# Patient Record
Sex: FEMALE | Race: WHITE | NOT HISPANIC OR LATINO | Employment: OTHER | URBAN - METROPOLITAN AREA
[De-identification: names, ages, dates, MRNs, and addresses within clinical notes are randomized per-mention and may not be internally consistent; named-entity substitution may affect disease eponyms.]

---

## 2022-08-03 ENCOUNTER — TELEPHONE (OUTPATIENT)
Dept: WOUND CARE | Facility: CLINIC | Age: 77
End: 2022-08-03

## 2022-08-03 ENCOUNTER — MEDICAL CORRESPONDENCE (OUTPATIENT)
Dept: HEALTH INFORMATION MANAGEMENT | Facility: CLINIC | Age: 77
End: 2022-08-03

## 2022-08-03 NOTE — TELEPHONE ENCOUNTER
Patient's daughter requesting appointment for patient to be seen for a right worthy wound. Can call Olivia (daughter) for triage or scheduling. Patient's home care RN, Karla, can also be reached with triage questions    Patient referred by [facility].   Ora ()  Karla (Home Care Nurse) [phone]  Olivia (patient's daughter) [phone]

## 2022-08-04 NOTE — TELEPHONE ENCOUNTER
Patient's daughter, Olivia, calling to verify referral from [facility] has been received.  States patient's previous wound care was provided by [facility].  Wanting to change to [facility] because home care is provided by [facility].     Olivia's Phone:   [phone]

## 2022-08-04 NOTE — TELEPHONE ENCOUNTER
Called Karla home care nurse regarding patient right shin wound.  She stated that patient needs a new wound treatment since the wound has gotten a lot better. Patient was on a wound vac and that was d/c'd and is now on a daily hydrofera blue treatment and cover.  Wound is granulating, decreasing in size, and no drainage.    Stated to Karla that we do need a referral to be seen here at the clinic, and was given [facility] phone number.  Waiting for the referral to proceed.

## 2022-08-08 NOTE — TELEPHONE ENCOUNTER
Consult received via phone and fax from daughter (and PCP provider aware) for wound of the right shin.    Patient has history of skin cancer, excision site right shin non healing.  Per daughter, has no known circulatory problems.  Has been followed by [facility].    Please schedule with CHITO Maxwell PA-C.     Is patient a [facility] lift?  Unknown; , please inquire.

## 2022-08-24 ENCOUNTER — HOSPITAL ENCOUNTER (OUTPATIENT)
Dept: WOUND CARE | Facility: CLINIC | Age: 77
Discharge: HOME | End: 2022-08-24
Attending: PHYSICIAN ASSISTANT | Admitting: PHYSICIAN ASSISTANT
Payer: COMMERCIAL

## 2022-08-24 VITALS — DIASTOLIC BLOOD PRESSURE: 115 MMHG | SYSTOLIC BLOOD PRESSURE: 158 MMHG | TEMPERATURE: 97.9 F | HEART RATE: 86 BPM

## 2022-08-24 DIAGNOSIS — L97.912 ULCER OF RIGHT LOWER EXTREMITY WITH FAT LAYER EXPOSED (H): ICD-10-CM

## 2022-08-24 PROBLEM — I10 ESSENTIAL HYPERTENSION: Status: ACTIVE | Noted: 2021-11-08

## 2022-08-24 PROBLEM — M85.80 OSTEOPENIA: Status: ACTIVE | Noted: 2021-11-12

## 2022-08-24 PROBLEM — M35.3 POLYMYALGIA RHEUMATICA (H): Status: ACTIVE | Noted: 2021-11-08

## 2022-08-24 PROBLEM — G47.33 OSA (OBSTRUCTIVE SLEEP APNEA): Status: ACTIVE | Noted: 2021-01-15

## 2022-08-24 PROBLEM — M54.2 NECK PAIN: Status: ACTIVE | Noted: 2022-07-10

## 2022-08-24 PROBLEM — I61.9 INTRAPARENCHYMAL HEMORRHAGE OF BRAIN (H): Status: ACTIVE | Noted: 2022-07-10

## 2022-08-24 PROBLEM — S12.100A CLOSED ODONTOID FRACTURE (H): Status: ACTIVE | Noted: 2022-07-10

## 2022-08-24 PROBLEM — F51.04 CHRONIC INSOMNIA: Status: ACTIVE | Noted: 2021-05-20

## 2022-08-24 PROBLEM — T14.8XXD WOUND HEALING, DELAYED: Status: ACTIVE | Noted: 2022-06-16

## 2022-08-24 PROBLEM — C44.722 SQUAMOUS CELL CARCINOMA, LEG, RIGHT: Status: ACTIVE | Noted: 2022-06-23

## 2022-08-24 PROBLEM — R35.1 NOCTURIA: Status: ACTIVE | Noted: 2021-05-20

## 2022-08-24 PROCEDURE — G0463 HOSPITAL OUTPT CLINIC VISIT: HCPCS | Mod: 25

## 2022-08-24 PROCEDURE — 17250 CHEM CAUT OF GRANLTJ TISSUE: CPT | Performed by: PHYSICIAN ASSISTANT

## 2022-08-24 PROCEDURE — 99204 OFFICE O/P NEW MOD 45 MIN: CPT | Mod: 25 | Performed by: PHYSICIAN ASSISTANT

## 2022-08-24 NOTE — PROGRESS NOTES
Patient arrived for wound care visit. Certified Wound Care Nurse time spent evaluating patient record, completed a full evaluation and documented wound(s) & cole-wound skin; provided recommendation based on treatment plan. Applied dressing, reviewed discharge instructions, patient education, and discussed plan of care with appropriate medical team staff members and patient and/or family members.

## 2022-08-24 NOTE — PROGRESS NOTES
[facility] [facility]    ASSESSMENT:   1. Full thickness surgical ulcer of RLE  2. 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 Wound care plan: collagen, superabsorbent, spandagrip  2. Call if hypergranulation tissue persists and can consider nurse/provider visit for silver nitrate  3. Can stop immobilization boot  4. See bottom of note for detailed wound care and patient instructions    HISTORY OF PRESENT ILLNESS:   Kitty Piper is a 77 year old female with PMHX of afib on xarelto, KIN, PMR on prednisone (tapering off on 1 mg) who presents for a slow healing RLE wound. Originally started traumatically in October of 2021 after hitting her leg on a bush. 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 This was down to muscle and initially required a wound VAC and she continues with an immobilizing boot. Fortunately today the wound is very smaller and just a bit hypergranular. It has been dressed with Hydrofera Blue three times a week by home care.     VITALS: BP (!) 158/115 (BP Location: Right arm)   Pulse 86   Temp 97.9  F (36.6  C)      PHYSICAL EXAM:  GENERAL: Patient is alert and oriented and in no acute distress  CV: palpable pedal pulses  INTEGUMENTARY:      08/24/22 1000   Wound (used by OP [facility] only) 08/24/22 1030 Right anterior;lower leg laceration   Placement Date/Time: 08/24/22 1030   Side: Right  Orientation: anterior;lower  Location: leg  Type: laceration   Thickness/Stage full thickness   Base hypergranulation   Periwound intact   Periwound Temperature warm   Periwound Skin Turgor soft   Edges open   Length (cm) 1.1   Width (cm) 1.2   Depth (cm) 0.2   Wound (cm^2) 1.32 cm^2   Wound Volume (cm^3) 0.26 cm^3   Drainage Characteristics/Odor serosanguineous   Drainage Amount moderate   Care, Wound chemical cautery applied         PROCEDURE (RLE): After verbal consent was obtained, hypergranulation tissue was treated  with silver nitrate. Patient tolerated this well.     MDM: 45 minutes were spent on the date of the visit reviewing previous chart notes, evaluating patient and developing the treatment plan, this excludes any time spent on procedures.     PATIENT INSTRUCTIONS      Further instructions from your care team       Kitty Piper      1945    [facility] Phone: [phone] Fax: [phone]    Medications/supplements to aid in healin. Vitamin D3 5,000 iu per day  2. Vitamin C 1,000 mg take daily    Wound Dressing Change: Right anterior lower leg  Cleanse with mild unscented soap and water  Apply endoform antimicrobial to wound  Then apply a superabsorbent dressing such as Kerramax or xtrasorb  Then secure with roll gauze and tape  Then apply spandigrip (tubigrip) E  Change three times a week    Compression:   Your compression is Spandigrip (tubigrip) E and can be removed at night and put back on first thing in the morning.   Please remove compression dressing if toes turn blue and/or tingle and can not be relieved by raising the leg for one hour. If unable to reapply in the morning, keep compression on until next dressing change.    Walk as much as you can, as you are able. Whenever you sit raise your ankle above your hips to promote wound healing.     Ok to stop wearing the boot    Ok to cancel the follow up visit if the wound has healed. If the wound heals, continue the compression sleeve for another month after wound healing.     Leyla Maxwell PA-C 2022    Call us at [phone] if you have any questions about your wounds, have redness or swelling around your wound, have a fever of 101 or greater or if you have any other problems or concerns. We answer the phone Monday through Friday 8 am to 4 pm, please leave a message as we check the voicemail frequently throughout the day.     If you had a positive experience please indicate that on your patient satisfaction survey form that [facility] will be sending you.    It was a pleasure meeting with you today.  Thank you for allowing me and my team the privilege of caring for you today.  YOU are the reason we are here, and I truly hope we provided you with the excellent service you deserve.  Please let us know if there is anything else we can do for you so that we can be sure you are leaving completely satisfied with your care experience.      If you have any billing related questions please call the [facility] Business office at [phone]. The clinic staff does not handle billing related matters.    If you are scheduled to have a follow up appointment, you will receive a reminder call the day before your visit. On the appointment day please arrive 15 minutes prior to your appointment time. If you are unable to keep that appointment, please call the clinic to cancel or reschedule. If you are more than 10 minutes late or greater for your appointment, the clinic policy is that you may be asked to reschedule.              Electronically signed by Leyla Maxwell PA-C on August 24, 2022

## 2022-08-24 NOTE — DISCHARGE INSTRUCTIONS
Kitty TAYLER Piper      1945    [facility] Phone: [phone] Fax: [phone]    Medications/supplements to aid in healing:  Vitamin D3 5,000 iu per day  Vitamin C 1,000 mg take daily    Wound Dressing Change: Right anterior lower leg  Cleanse with mild unscented soap and water  Apply endoform antimicrobial to wound  Then apply a superabsorbent dressing such as Kerramax or xtrasorb  Then secure with roll gauze and tape  Then apply spandigrip (tubigrip) E  Change three times a week    Compression:   Your compression is Spandigrip (tubigrip) E and can be removed at night and put back on first thing in the morning.   Please remove compression dressing if toes turn blue and/or tingle and can not be relieved by raising the leg for one hour. If unable to reapply in the morning, keep compression on until next dressing change.    Walk as much as you can, as you are able. Whenever you sit raise your ankle above your hips to promote wound healing.     Ok to stop wearing the boot    Ok to cancel the follow up visit if the wound has healed. If the wound heals, continue the compression sleeve for another month after wound healing.     Leyla Maxwell PA-C August 24, 2022    Call us at [phone] if you have any questions about your wounds, have redness or swelling around your wound, have a fever of 101 or greater or if you have any other problems or concerns. We answer the phone Monday through Friday 8 am to 4 pm, please leave a message as we check the voicemail frequently throughout the day.     If you had a positive experience please indicate that on your patient satisfaction survey form that [facility] will be sending you.    It was a pleasure meeting with you today.  Thank you for allowing me and my team the privilege of caring for you today.  YOU are the reason we are here, and I truly hope we provided you with the excellent service you deserve.  Please let us know if there is anything else we can do  for you so that we can be sure you are leaving completely satisfied with your care experience.      If you have any billing related questions please call the [facility] Business office at [phone]. The clinic staff does not handle billing related matters.    If you are scheduled to have a follow up appointment, you will receive a reminder call the day before your visit. On the appointment day please arrive 15 minutes prior to your appointment time. If you are unable to keep that appointment, please call the clinic to cancel or reschedule. If you are more than 10 minutes late or greater for your appointment, the clinic policy is that you may be asked to reschedule.

## 2022-09-01 ENCOUNTER — TELEPHONE (OUTPATIENT)
Dept: WOUND CARE | Facility: CLINIC | Age: 77
End: 2022-09-01

## 2022-09-01 NOTE — TELEPHONE ENCOUNTER
Patient called, has home care coming this afternoon to help her shower and she has some questions.  [phone]

## 2022-09-01 NOTE — TELEPHONE ENCOUNTER
Returned call to patient. She was wondering if she can get the wound wet in the shower. Ok given to get wound wet. No further questions or concerns.

## 2022-09-09 ENCOUNTER — LAB REQUISITION (OUTPATIENT)
Dept: LAB | Facility: CLINIC | Age: 77
End: 2022-09-09
Payer: COMMERCIAL

## 2022-09-09 LAB
ANION GAP SERPL CALCULATED.3IONS-SCNC: 7 MMOL/L (ref 3–14)
BUN SERPL-MCNC: 12 MG/DL (ref 7–30)
CALCIUM SERPL-MCNC: 9.2 MG/DL (ref 8.5–10.1)
CHLORIDE BLD-SCNC: 98 MMOL/L (ref 94–109)
CO2 SERPL-SCNC: 26 MMOL/L (ref 20–32)
CREAT SERPL-MCNC: 0.7 MG/DL (ref 0.52–1.04)
GFR SERPL CREATININE-BSD FRML MDRD: 89 ML/MIN/1.73M2
GLUCOSE BLD-MCNC: 92 MG/DL (ref 70–99)
POTASSIUM BLD-SCNC: 3.8 MMOL/L (ref 3.4–5.3)
SODIUM SERPL-SCNC: 131 MMOL/L (ref 133–144)

## 2022-09-09 PROCEDURE — 80048 BASIC METABOLIC PNL TOTAL CA: CPT | Mod: ORL | Performed by: PHYSICIAN ASSISTANT

## 2022-09-12 ENCOUNTER — TELEPHONE (OUTPATIENT)
Dept: WOUND CARE | Facility: CLINIC | Age: 77
End: 2022-09-12

## 2022-09-12 NOTE — TELEPHONE ENCOUNTER
[facility]    Who is the name of the provider?:  Alissa      What is the location you see this provider at?: [place]    Reason for call:  Recommendation for new wound care orders, current wound care is not effective.     Can we leave a detailed message on this number?  YES

## 2022-09-12 NOTE — TELEPHONE ENCOUNTER
Call placed to Rafaela Erazo RN with [facility] with voice message left  r/t new wound care recommendations.  Per, Dr. Mahmood change endocare to ioplex.  Apply Hydrogel to wound bed then rinse the Ioplex with Saline and apply.  Rafaela to return call with any further questions are concerns.